# Patient Record
Sex: FEMALE | Race: WHITE
[De-identification: names, ages, dates, MRNs, and addresses within clinical notes are randomized per-mention and may not be internally consistent; named-entity substitution may affect disease eponyms.]

---
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
## 2023-04-13 NOTE — ED PHYSICIAN DOCUMENTATION
General Adult





- HISTORIAN


Historian: patient





- HPI


Stated Complaint: Fever/nausea & vomiting X1 just PTA


Chief Complaint: General Adult


Onset: days ago


Timing: still present


Severity: moderate


Further Comments: yes (Pt is a 20 yo female with n/v, fever, and ear ache.  Temp

was 101.2 on presentation.  Pt has had sore throat, swollen lymph glands.)





- ROS


CONST: fever, chills


EYES/ENT: sore throat, other (ear pain)


CVS/RESP: none


GI/: vomiting


MS/SKIN/LYMPH: none





- PAST HX


Past History: none


Allergies/Adverse Reactions: 


                                    Allergies











Allergy/AdvReac Type Severity Reaction Status Date / Time


 


No Known Allergies Allergy   Verified 12/18/19 02:37














Home Medications: 


                                Ambulatory Orders











 Medication  Instructions  Recorded


 


Cephalexin [Keflex] 500 mg PO TID #30 capsule 12/18/19


 


Etonogestrel [Nexplanon] 68 mg SQ PRN PRN 12/18/19














- SOCIAL HX


Smoking History: non-smoker





- FAMILY HX


Family History: No





- VITAL SIGNS


Vital Signs: 





                                   Vital Signs











Temp Pulse Resp BP Pulse Ox


 


 101.2 F H  93   14 L  118/60   98 


 


 12/18/19 02:23  12/18/19 02:23  12/18/19 02:23  12/18/19 02:23  12/18/19 02:23














- REVIEWED ASSESSMENTS


Nursing Assessment  Reviewed: Yes


Vitals Reviewed: Yes





Progress





- Progress


Progress: 





Rx Keflex 500 mg.  Take one by mouth every 8 hours for 10 days.  1st dose in ER.





Tylenol/Motrin as directed for fever.





Drink plenty of fluids.





General Adult Physical Exam





- PHYSICAL EXAM


GENERAL APPEARANCE: mild distress


EENT: pharyngeal erythema


NECK: normal inspection, supple, lymphadenopathy


RESPIRATORY: no resp distress, chest non-tender, breath sounds normal


CVS: reg rate & rhythm, heart sounds normal


ABDOMEN: soft, no organomegaly, normal bowel sounds


BACK: normal inspection


SKIN: warm/dry, normal color


EXTREMITIES: non-tender, normal range of motion, no evidence of injury


NEURO: oriented X3, motor nml, sensation nml





Discharge


Clincal Impression: 


 Strep pharyngitis





Prescriptions: 


Cephalexin [Keflex] 500 mg PO TID #30 capsule


Referrals: 


Cori Dorman MD [Primary Care Provider] - 


Condition: Stable


Disposition: 01 HOME, SELF-CARE


Decision to Admit: NO


Decision Time: 03:25
Food Insecurity ; BMI < 19